# Patient Record
Sex: FEMALE | Race: BLACK OR AFRICAN AMERICAN | NOT HISPANIC OR LATINO | URBAN - METROPOLITAN AREA
[De-identification: names, ages, dates, MRNs, and addresses within clinical notes are randomized per-mention and may not be internally consistent; named-entity substitution may affect disease eponyms.]

---

## 2020-07-25 ENCOUNTER — TELEPHONE ENCOUNTER (OUTPATIENT)
Dept: URBAN - METROPOLITAN AREA CLINIC 13 | Facility: CLINIC | Age: 45
End: 2020-07-25

## 2020-07-26 ENCOUNTER — TELEPHONE ENCOUNTER (OUTPATIENT)
Dept: URBAN - METROPOLITAN AREA CLINIC 13 | Facility: CLINIC | Age: 45
End: 2020-07-26

## 2023-06-09 ENCOUNTER — OFFICE VISIT (OUTPATIENT)
Dept: URBAN - METROPOLITAN AREA CLINIC 113 | Facility: CLINIC | Age: 48
End: 2023-06-09

## 2023-06-09 NOTE — HPI-TODAY'S VISIT:
This is a 48-year-old female with a history of hypertension, blood clots on Xarelto referred from atopy off a lobby, NP for evaluation of elevated liver enzymes and alcohol abuse. Labs 4/17/2023:CBC: WBC 3.9, (hemoglobin not included in results) MCV 83, platelet 240.  BMP normal with exception of glucose 116.  LFTs: TB 0.8, , ALT 76, .  Hepatitis C antibody nonreactive.  Hemoglobin A1c 4.9.  HIV nonreactive.  Lipid panel normal with exception of cholesterol 207.  RPR nonreactive.  TSH 0.090.  Free T41.15. Abdominal ultrasound 7/14/2022:Hepatic steatosis.  Unremarkable sonographic examination of the gallbladder.